# Patient Record
Sex: FEMALE | Race: BLACK OR AFRICAN AMERICAN | NOT HISPANIC OR LATINO | URBAN - METROPOLITAN AREA
[De-identification: names, ages, dates, MRNs, and addresses within clinical notes are randomized per-mention and may not be internally consistent; named-entity substitution may affect disease eponyms.]

---

## 2020-06-16 ENCOUNTER — OFFICE VISIT (OUTPATIENT)
Dept: URBAN - METROPOLITAN AREA SURGERY CENTER 16 | Facility: SURGERY CENTER | Age: 55
End: 2020-06-16

## 2020-06-17 ENCOUNTER — OFFICE VISIT (OUTPATIENT)
Dept: URBAN - METROPOLITAN AREA SURGERY CENTER 16 | Facility: SURGERY CENTER | Age: 55
End: 2020-06-17
Payer: COMMERCIAL

## 2020-06-17 ENCOUNTER — CLAIMS CREATED FROM THE CLAIM WINDOW (OUTPATIENT)
Dept: URBAN - METROPOLITAN AREA CLINIC 4 | Facility: CLINIC | Age: 55
End: 2020-06-17
Payer: COMMERCIAL

## 2020-06-17 DIAGNOSIS — R10.84 ABDOMINAL CRAMPING, GENERALIZED: ICD-10-CM

## 2020-06-17 DIAGNOSIS — R11.2 NAUSEA WITH VOMITING, UNSPECIFIED: ICD-10-CM

## 2020-06-17 DIAGNOSIS — K92.1 BLACK STOOL: ICD-10-CM

## 2020-06-17 DIAGNOSIS — D50.9 IRON DEFICIENCY ANEMIA, UNSPECIFIED: ICD-10-CM

## 2020-06-17 DIAGNOSIS — R19.7 ACUTE DIARRHEA: ICD-10-CM

## 2020-06-17 DIAGNOSIS — R10.9 UNSPECIFIED ABDOMINAL PAIN: ICD-10-CM

## 2020-06-17 PROCEDURE — 45380 COLONOSCOPY AND BIOPSY: CPT | Performed by: INTERNAL MEDICINE

## 2020-06-17 PROCEDURE — G9937 DIG OR SURV COLSCO: HCPCS | Performed by: INTERNAL MEDICINE

## 2020-06-17 PROCEDURE — G8907 PT DOC NO EVENTS ON DISCHARG: HCPCS | Performed by: INTERNAL MEDICINE

## 2020-06-17 PROCEDURE — 88305 TISSUE EXAM BY PATHOLOGIST: CPT | Performed by: PATHOLOGY

## 2020-07-30 ENCOUNTER — ERX REFILL RESPONSE (OUTPATIENT)
Age: 55
End: 2020-07-30

## 2020-07-30 RX ORDER — HYOSCYAMINE SULFATE 0.12 MG/1
TAKE 1 TABLET BY MOUTH OR SUBLINGUALLY EVERY 4 HOURS AS NEEDED FOR PAIN TABLET ORAL; SUBLINGUAL
Qty: 120 | Refills: 3

## 2020-08-05 ENCOUNTER — OFFICE VISIT (OUTPATIENT)
Dept: URBAN - METROPOLITAN AREA TELEHEALTH 2 | Facility: TELEHEALTH | Age: 55
End: 2020-08-05
Payer: COMMERCIAL

## 2020-08-05 DIAGNOSIS — K62.5 BRBPR (BRIGHT RED BLOOD PER RECTUM): ICD-10-CM

## 2020-08-05 DIAGNOSIS — K59.01 CONSTIPATION: ICD-10-CM

## 2020-08-05 DIAGNOSIS — R10.9 ABDOMINAL PAIN: ICD-10-CM

## 2020-08-05 DIAGNOSIS — R10.84 ABDOMINAL CRAMPING, GENERALIZED: ICD-10-CM

## 2020-08-05 DIAGNOSIS — K82.4 GALLBLADDER POLYP: ICD-10-CM

## 2020-08-05 DIAGNOSIS — R11.0 NAUSEA: ICD-10-CM

## 2020-08-05 PROCEDURE — 99214 OFFICE O/P EST MOD 30 MIN: CPT | Performed by: INTERNAL MEDICINE

## 2020-08-05 RX ORDER — HYOSCYAMINE SULFATE 0.12 MG/1
1 TAB PO/SL Q4HR PRN PAIN TABLET, ORALLY DISINTEGRATING ORAL
Qty: 120 | Refills: 3 | Status: ACTIVE | COMMUNITY
Start: 2020-04-29 | End: 2020-08-27

## 2020-08-05 RX ORDER — AMOXICILLIN 500 MG/1
TAKE 1 TABLET (500 MG) BY ORAL ROUTE 3 TIMES PER DAY TABLET, FILM COATED ORAL
Qty: 0 | Refills: 0 | Status: DISCONTINUED | COMMUNITY
Start: 1900-01-01

## 2020-08-05 RX ORDER — NAPROXEN 500 MG/1
TAKE 1 TABLET (500 MG) BY ORAL ROUTE 2 TIMES PER DAY WITH FOOD TABLET ORAL 2
Qty: 0 | Refills: 0 | Status: ACTIVE | COMMUNITY
Start: 1900-01-01

## 2020-08-05 RX ORDER — MILNACIPRAN HCL 12.5 MG
TAKE 2 TABLETS (25 MG) BY ORAL ROUTE 2 TIMES PER DAY TABLET ORAL 2
Qty: 0 | Refills: 0 | Status: ACTIVE | COMMUNITY
Start: 1900-01-01

## 2020-08-05 RX ORDER — VALSARTAN 40 MG/1
TABLET ORAL
Qty: 0 | Refills: 0 | Status: ACTIVE | COMMUNITY
Start: 1900-01-01

## 2020-08-05 RX ORDER — VERAPAMIL HYDROCHLORIDE 240 MG/1
TAKE 1 CAPSULE (240 MG) BY ORAL ROUTE ONCE DAILY CAPSULE, DELAYED RELEASE PELLETS ORAL 1
Qty: 0 | Refills: 0 | Status: ACTIVE | COMMUNITY
Start: 1900-01-01

## 2020-08-05 RX ORDER — ACETAMINOPHEN AND CODEINE PHOSPHATE 300; 30 MG/1; MG/1
TABLET ORAL
Qty: 0 | Refills: 0 | Status: ACTIVE | COMMUNITY
Start: 1900-01-01

## 2020-08-05 RX ORDER — OMEPRAZOLE 10 MG/1
CAPSULE, DELAYED RELEASE ORAL
Qty: 0 | Refills: 0 | Status: ACTIVE | COMMUNITY
Start: 1900-01-01

## 2020-08-05 RX ORDER — HYOSCYAMINE SULFATE 0.12 MG/1
TAKE 1 TABLET BY MOUTH OR SUBLINGUALLY EVERY 4 HOURS AS NEEDED FOR PAIN TABLET ORAL; SUBLINGUAL
Qty: 120 | Refills: 3 | Status: ACTIVE | COMMUNITY

## 2020-08-05 RX ORDER — TRAMADOL HYDROCHLORIDE 50 MG/1
TABLET, COATED ORAL
Qty: 0 | Refills: 0 | Status: ACTIVE | COMMUNITY
Start: 1900-01-01

## 2020-08-05 RX ORDER — SACCHAROMYCES BOULARDII 250 MG
TAKE 2 CAPSULES BY ORAL ROUTE 2 TIMES A DAY FOR 30 DAYS CAPSULE ORAL 2
Qty: 120 | Refills: 11 | Status: ACTIVE | COMMUNITY
Start: 2020-04-29 | End: 2021-04-24

## 2020-08-05 RX ORDER — HYDROXYCHLOROQUINE SULFATE 200 MG/1
TABLET ORAL
Qty: 0 | Refills: 0 | Status: ACTIVE | COMMUNITY
Start: 1900-01-01

## 2020-08-05 RX ORDER — ESOMEPRAZOLE MAGNESIUM 40 MG/1
TAKE 1 CAPSULE (40 MG) BY ORAL ROUTE ONCE DAILY CAPSULE, DELAYED RELEASE PELLETS ORAL 1
Qty: 0 | Refills: 0 | Status: ACTIVE | COMMUNITY
Start: 1900-01-01

## 2020-08-05 RX ORDER — CALCIUM POLYCARBOPHIL 625 MG
TAKE 2 TABLETS BY ORAL ROUTE 2 TIMES A DAY FOR 30 DAYS TABLET ORAL 2
Qty: 120 | Refills: 11 | Status: ACTIVE | COMMUNITY
Start: 2020-04-29 | End: 2021-04-24

## 2020-08-05 RX ORDER — AMOXICILLIN AND CLAVULANATE POTASSIUM 875; 125 MG/1; MG/1
TABLET, FILM COATED ORAL
Qty: 20 UNSPECIFIED | Status: ACTIVE | COMMUNITY

## 2020-08-05 RX ORDER — CETIRIZINE HYDROCHLORIDE 10 MG/1
CAPSULE, LIQUID FILLED ORAL
Qty: 0 | Refills: 0 | Status: ACTIVE | COMMUNITY
Start: 1900-01-01

## 2020-08-05 NOTE — HPI-OTHER HISTORIES
Telehealth visit  rx'ed Fibercon and Florastor BID and Levsin prn at prior visit   Abd pain less freq and severe - nothing sharp  No Hematochezia except v.rare BRBPR w wiping  Nausea has resolved  BM's now less regular - 1 BM/day despite Metamucil  wt incr 5# over 3mo  RUQ US 5/14/20 WNL aside from 3mm GB polyp Pelvic US small uterine fibroid  small L ovarian cyst small amt fluid cul de sac  Colonoscopy 6/16/20 WNL, bx's negative

## 2020-10-28 ENCOUNTER — ERX REFILL RESPONSE (OUTPATIENT)
Age: 55
End: 2020-10-28

## 2020-10-28 RX ORDER — HYOSCYAMINE SULFATE 0.12 MG/1
TAKE 1 TABLET BY MOUTH OR SUBLINGUALLY EVERY 4 HOURS AS NEEDED FOR PAIN TABLET ORAL; SUBLINGUAL
Qty: 120 | Refills: 3

## 2021-02-03 ENCOUNTER — ERX REFILL RESPONSE (OUTPATIENT)
Age: 56
End: 2021-02-03

## 2021-02-03 RX ORDER — HYOSCYAMINE SULFATE 0.12 MG/1
TAKE 1 TABLET BY MOUTH OR SUBLINGUALLY EVERY 4 HOURS AS NEEDED FOR PAIN TABLET ORAL; SUBLINGUAL
Qty: 120 | Refills: 3

## 2021-05-18 ENCOUNTER — ERX REFILL RESPONSE (OUTPATIENT)
Dept: URBAN - METROPOLITAN AREA CLINIC 92 | Facility: CLINIC | Age: 56
End: 2021-05-18

## 2021-05-18 RX ORDER — HYOSCYAMINE SULFATE 0.12 MG/1
1 TABLET AS NEEDED TABLET ORAL; SUBLINGUAL
Qty: 120 TABLET | Refills: 2

## 2021-08-01 ENCOUNTER — ERX REFILL RESPONSE (OUTPATIENT)
Dept: URBAN - METROPOLITAN AREA CLINIC 92 | Facility: CLINIC | Age: 56
End: 2021-08-01

## 2021-08-01 RX ORDER — HYOSCYAMINE SULFATE 0.12 MG/1
1 TABLET AS NEEDED TABLET ORAL; SUBLINGUAL
Qty: 120 TABLET | Refills: 2 | OUTPATIENT

## 2021-08-01 RX ORDER — HYOSCYAMINE SULFATE 0.12 MG/1
TAKE 1 TABLET AS NEEDED FOUR TIMES A DAY ORALLY 30 DAYS TABLET ORAL; SUBLINGUAL
Qty: 120 TABLET | Refills: 1 | OUTPATIENT

## 2021-08-03 ENCOUNTER — OFFICE VISIT (OUTPATIENT)
Dept: URBAN - METROPOLITAN AREA TELEHEALTH 2 | Facility: TELEHEALTH | Age: 56
End: 2021-08-03

## 2021-08-03 RX ORDER — AMOXICILLIN AND CLAVULANATE POTASSIUM 875; 125 MG/1; MG/1
TABLET, FILM COATED ORAL
Qty: 20 UNSPECIFIED | COMMUNITY

## 2021-08-03 RX ORDER — NAPROXEN 500 MG/1
TAKE 1 TABLET (500 MG) BY ORAL ROUTE 2 TIMES PER DAY WITH FOOD TABLET ORAL 2
Qty: 0 | Refills: 0 | COMMUNITY
Start: 1900-01-01

## 2021-08-03 RX ORDER — OMEPRAZOLE 10 MG/1
CAPSULE, DELAYED RELEASE ORAL
Qty: 0 | Refills: 0 | COMMUNITY
Start: 1900-01-01

## 2021-08-03 RX ORDER — VERAPAMIL HYDROCHLORIDE 240 MG/1
TAKE 1 CAPSULE (240 MG) BY ORAL ROUTE ONCE DAILY CAPSULE, DELAYED RELEASE PELLETS ORAL 1
Qty: 0 | Refills: 0 | COMMUNITY
Start: 1900-01-01

## 2021-08-03 RX ORDER — ACETAMINOPHEN AND CODEINE PHOSPHATE 300; 30 MG/1; MG/1
TABLET ORAL
Qty: 0 | Refills: 0 | COMMUNITY
Start: 1900-01-01

## 2021-08-03 RX ORDER — ESOMEPRAZOLE MAGNESIUM 40 MG/1
TAKE 1 CAPSULE (40 MG) BY ORAL ROUTE ONCE DAILY CAPSULE, DELAYED RELEASE PELLETS ORAL 1
Qty: 0 | Refills: 0 | COMMUNITY
Start: 1900-01-01

## 2021-08-03 RX ORDER — CETIRIZINE HYDROCHLORIDE 10 MG/1
CAPSULE, LIQUID FILLED ORAL
Qty: 0 | Refills: 0 | COMMUNITY
Start: 1900-01-01

## 2021-08-03 RX ORDER — HYOSCYAMINE SULFATE 0.12 MG/1
1 TABLET AS NEEDED TABLET ORAL; SUBLINGUAL
Qty: 120 TABLET | Refills: 2 | COMMUNITY

## 2021-08-03 RX ORDER — VALSARTAN 40 MG/1
TABLET ORAL
Qty: 0 | Refills: 0 | COMMUNITY
Start: 1900-01-01

## 2021-08-03 RX ORDER — MILNACIPRAN HCL 12.5 MG
TAKE 2 TABLETS (25 MG) BY ORAL ROUTE 2 TIMES PER DAY TABLET ORAL 2
Qty: 0 | Refills: 0 | COMMUNITY
Start: 1900-01-01

## 2021-08-03 RX ORDER — HYDROXYCHLOROQUINE SULFATE 200 MG/1
TABLET ORAL
Qty: 0 | Refills: 0 | COMMUNITY
Start: 1900-01-01

## 2021-08-03 RX ORDER — TRAMADOL HYDROCHLORIDE 50 MG/1
TABLET, COATED ORAL
Qty: 0 | Refills: 0 | COMMUNITY
Start: 1900-01-01

## 2021-08-03 NOTE — HPI-OTHER HISTORIES
Telehealth visit  wt __# over 1y (was 215)  on Fibercon and Florastor BID and Levsin prn at prior visit   Abd pain less freq and severe - nothing sharp  No Hematochezia except v.rare BRBPR w wiping  Nausea has resolved  BM's now less regular - 1 BM/day despite Metamucil  RUQ US 5/14/20 WNL aside from 3mm GB polyp Pelvic US small uterine fibroid  small L ovarian cyst small amt fluid cul de sac  Colonoscopy 6/16/20 WNL, bx's negative

## 2021-08-23 ENCOUNTER — ERX REFILL RESPONSE (OUTPATIENT)
Dept: URBAN - METROPOLITAN AREA CLINIC 92 | Facility: CLINIC | Age: 56
End: 2021-08-23

## 2021-08-23 RX ORDER — HYOSCYAMINE SULFATE 0.12 MG/1
TAKE 1 TABLET AS NEEDED FOUR TIMES A DAY ORALLY 30 DAYS TABLET ORAL; SUBLINGUAL
Qty: 120 TABLET | Refills: 1 | OUTPATIENT

## 2021-08-23 RX ORDER — HYOSCYAMINE SULFATE 0.12 MG/1
TAKE 1 TABLET BY MOUTH 4 TIMES A DAY AS NEEDED TABLET ORAL; SUBLINGUAL
Qty: 120 TABLET | Refills: 1 | OUTPATIENT

## 2022-04-06 ENCOUNTER — ERX REFILL RESPONSE (OUTPATIENT)
Dept: URBAN - METROPOLITAN AREA CLINIC 92 | Facility: CLINIC | Age: 57
End: 2022-04-06

## 2022-04-06 RX ORDER — HYOSCYAMINE SULFATE 0.12 MG/1
TAKE 1 TABLET BY MOUTH OR SUBLINGUALLY EVERY 4 HOURS AS NEEDED FOR PAIN 20 TABLET ORAL; SUBLINGUAL
Qty: 120 TABLET | Refills: 1 | OUTPATIENT

## 2022-04-06 RX ORDER — HYOSCYAMINE SULFATE 0.12 MG/1
TAKE 1 TABLET BY MOUTH 4 TIMES A DAY AS NEEDED TABLET ORAL; SUBLINGUAL
Qty: 120 TABLET | Refills: 1 | OUTPATIENT

## 2022-04-16 ENCOUNTER — ERX REFILL RESPONSE (OUTPATIENT)
Dept: URBAN - METROPOLITAN AREA CLINIC 92 | Facility: CLINIC | Age: 57
End: 2022-04-16

## 2022-04-16 RX ORDER — HYOSCYAMINE SULFATE 0.12 MG/1
TAKE 1 TABLET BY MOUTH OR SUBLINGUALLY EVERY 4 HOURS AS NEEDED FOR PAIN 20 TABLET ORAL; SUBLINGUAL
Qty: 120 TABLET | Refills: 1 | OUTPATIENT

## 2022-04-22 ENCOUNTER — OFFICE VISIT (OUTPATIENT)
Dept: URBAN - METROPOLITAN AREA TELEHEALTH 2 | Facility: TELEHEALTH | Age: 57
End: 2022-04-22

## 2022-04-22 RX ORDER — CETIRIZINE HYDROCHLORIDE 10 MG/1
CAPSULE, LIQUID FILLED ORAL
Qty: 0 | Refills: 0 | COMMUNITY
Start: 1900-01-01

## 2022-04-22 RX ORDER — TRAMADOL HYDROCHLORIDE 50 MG/1
TABLET, COATED ORAL
Qty: 0 | Refills: 0 | COMMUNITY
Start: 1900-01-01

## 2022-04-22 RX ORDER — NAPROXEN 500 MG/1
TAKE 1 TABLET (500 MG) BY ORAL ROUTE 2 TIMES PER DAY WITH FOOD TABLET ORAL 2
Qty: 0 | Refills: 0 | COMMUNITY
Start: 1900-01-01

## 2022-04-22 RX ORDER — OMEPRAZOLE 10 MG/1
CAPSULE, DELAYED RELEASE ORAL
Qty: 0 | Refills: 0 | COMMUNITY
Start: 1900-01-01

## 2022-04-22 RX ORDER — HYDROXYCHLOROQUINE SULFATE 200 MG/1
TABLET ORAL
Qty: 0 | Refills: 0 | COMMUNITY
Start: 1900-01-01

## 2022-04-22 RX ORDER — VALSARTAN 40 MG/1
TABLET ORAL
Qty: 0 | Refills: 0 | COMMUNITY
Start: 1900-01-01

## 2022-04-22 RX ORDER — VERAPAMIL HYDROCHLORIDE 240 MG/1
TAKE 1 CAPSULE (240 MG) BY ORAL ROUTE ONCE DAILY CAPSULE, DELAYED RELEASE PELLETS ORAL 1
Qty: 0 | Refills: 0 | COMMUNITY
Start: 1900-01-01

## 2022-04-22 RX ORDER — ACETAMINOPHEN AND CODEINE PHOSPHATE 300; 30 MG/1; MG/1
TABLET ORAL
Qty: 0 | Refills: 0 | COMMUNITY
Start: 1900-01-01

## 2022-04-22 RX ORDER — AMOXICILLIN AND CLAVULANATE POTASSIUM 875; 125 MG/1; MG/1
TABLET, FILM COATED ORAL
Qty: 20 UNSPECIFIED | COMMUNITY

## 2022-04-22 RX ORDER — ESOMEPRAZOLE MAGNESIUM 40 MG/1
TAKE 1 CAPSULE (40 MG) BY ORAL ROUTE ONCE DAILY CAPSULE, DELAYED RELEASE PELLETS ORAL 1
Qty: 0 | Refills: 0 | COMMUNITY
Start: 1900-01-01

## 2022-04-22 RX ORDER — MILNACIPRAN HCL 12.5 MG
TAKE 2 TABLETS (25 MG) BY ORAL ROUTE 2 TIMES PER DAY TABLET ORAL 2
Qty: 0 | Refills: 0 | COMMUNITY
Start: 1900-01-01

## 2022-04-22 RX ORDER — HYOSCYAMINE SULFATE 0.12 MG/1
TAKE 1 TABLET BY MOUTH OR SUBLINGUALLY EVERY 4 HOURS AS NEEDED FOR PAIN 20 TABLET ORAL; SUBLINGUAL
Qty: 120 TABLET | Refills: 1 | COMMUNITY

## 2022-04-22 NOTE — HPI-OTHER HISTORIES
wt __# over 1.5yr (was 215)  on Metamucil and Miralax  Abd pain less freq and severe - nothing sharp  No Hematochezia except v.rare BRBPR w wiping  Nausea has resolved  BM's now less regular - 1 BM/day despite Metamucil  RUQ US 5/14/20 WNL aside from 3mm GB polyp Pelvic US small uterine fibroid  small L ovarian cyst small amt fluid cul de sac  Colonoscopy 6/16/20 WNL, bx's negative

## 2022-07-13 ENCOUNTER — DASHBOARD ENCOUNTERS (OUTPATIENT)
Age: 57
End: 2022-07-13

## 2022-07-13 PROBLEM — 14760008 CONSTIPATION: Status: ACTIVE | Noted: 2021-07-29

## 2022-07-15 ENCOUNTER — OFFICE VISIT (OUTPATIENT)
Dept: URBAN - METROPOLITAN AREA TELEHEALTH 2 | Facility: TELEHEALTH | Age: 57
End: 2022-07-15

## 2022-07-15 RX ORDER — VERAPAMIL HYDROCHLORIDE 240 MG/1
TAKE 1 CAPSULE (240 MG) BY ORAL ROUTE ONCE DAILY CAPSULE, DELAYED RELEASE PELLETS ORAL 1
Qty: 0 | Refills: 0 | COMMUNITY
Start: 1900-01-01

## 2022-07-15 RX ORDER — NAPROXEN 500 MG/1
TAKE 1 TABLET (500 MG) BY ORAL ROUTE 2 TIMES PER DAY WITH FOOD TABLET ORAL 2
Qty: 0 | Refills: 0 | COMMUNITY
Start: 1900-01-01

## 2022-07-15 RX ORDER — CETIRIZINE HYDROCHLORIDE 10 MG/1
CAPSULE, LIQUID FILLED ORAL
Qty: 0 | Refills: 0 | COMMUNITY
Start: 1900-01-01

## 2022-07-15 RX ORDER — ACETAMINOPHEN AND CODEINE PHOSPHATE 300; 30 MG/1; MG/1
TABLET ORAL
Qty: 0 | Refills: 0 | COMMUNITY
Start: 1900-01-01

## 2022-07-15 RX ORDER — HYOSCYAMINE SULFATE 0.12 MG/1
TAKE 1 TABLET BY MOUTH OR SUBLINGUALLY EVERY 4 HOURS AS NEEDED FOR PAIN 20 TABLET ORAL; SUBLINGUAL
Qty: 120 TABLET | Refills: 1 | COMMUNITY

## 2022-07-15 RX ORDER — OMEPRAZOLE 10 MG/1
CAPSULE, DELAYED RELEASE ORAL
Qty: 0 | Refills: 0 | COMMUNITY
Start: 1900-01-01

## 2022-07-15 RX ORDER — VALSARTAN 40 MG/1
TABLET ORAL
Qty: 0 | Refills: 0 | COMMUNITY
Start: 1900-01-01

## 2022-07-15 RX ORDER — AMOXICILLIN AND CLAVULANATE POTASSIUM 875; 125 MG/1; MG/1
TABLET, FILM COATED ORAL
Qty: 20 UNSPECIFIED | COMMUNITY

## 2022-07-15 RX ORDER — HYDROXYCHLOROQUINE SULFATE 200 MG/1
TABLET ORAL
Qty: 0 | Refills: 0 | COMMUNITY
Start: 1900-01-01

## 2022-07-15 RX ORDER — MILNACIPRAN HCL 12.5 MG
TAKE 2 TABLETS (25 MG) BY ORAL ROUTE 2 TIMES PER DAY TABLET ORAL 2
Qty: 0 | Refills: 0 | COMMUNITY
Start: 1900-01-01

## 2022-07-15 RX ORDER — TRAMADOL HYDROCHLORIDE 50 MG/1
TABLET, COATED ORAL
Qty: 0 | Refills: 0 | COMMUNITY
Start: 1900-01-01

## 2022-07-15 RX ORDER — ESOMEPRAZOLE MAGNESIUM 40 MG/1
TAKE 1 CAPSULE (40 MG) BY ORAL ROUTE ONCE DAILY CAPSULE, DELAYED RELEASE PELLETS ORAL 1
Qty: 0 | Refills: 0 | COMMUNITY
Start: 1900-01-01

## 2022-07-15 NOTE — HPI-OTHER HISTORIES
wt __# over 2yr (was 215)  on Metamucil and Miralax  Abd pain less freq and severe - nothing sharp  No Hematochezia except v.rare BRBPR w wiping  Nausea has resolved  BM's now less regular - 1 BM/day despite Metamucil  RUQ US 5/14/20 WNL aside from 3mm GB polyp Pelvic US small uterine fibroid  small L ovarian cyst small amt fluid cul de sac  Colonoscopy 6/16/20 WNL, bx's negative

## 2022-07-24 ENCOUNTER — TELEPHONE ENCOUNTER (OUTPATIENT)
Dept: URBAN - METROPOLITAN AREA CLINIC 92 | Facility: CLINIC | Age: 57
End: 2022-07-24

## 2022-07-24 ENCOUNTER — ERX REFILL RESPONSE (OUTPATIENT)
Dept: URBAN - METROPOLITAN AREA CLINIC 92 | Facility: CLINIC | Age: 57
End: 2022-07-24

## 2022-07-24 RX ORDER — HYOSCYAMINE SULFATE 0.12 MG/1
TAKE 1 TABLET BY MOUTH OR SUBLINGUALLY EVERY 4 HOURS AS NEEDED FOR PAIN 20 TABLET ORAL; SUBLINGUAL
Qty: 120 TABLET | Refills: 1 | OUTPATIENT

## 2022-07-24 RX ORDER — HYOSCYAMINE SULFATE 0.12 MG/1
TAKE 1 TABLET BY MOUTH OR SUBLINGUALLY EVERY 4 HOURS AS NEEDED FOR PAIN 20 TABLET ORAL; SUBLINGUAL
Qty: 120 TABLET | Refills: 0 | OUTPATIENT

## 2022-08-04 ENCOUNTER — OFFICE VISIT (OUTPATIENT)
Dept: URBAN - METROPOLITAN AREA TELEHEALTH 2 | Facility: TELEHEALTH | Age: 57
End: 2022-08-04

## 2022-08-10 ENCOUNTER — TELEPHONE ENCOUNTER (OUTPATIENT)
Dept: URBAN - METROPOLITAN AREA CLINIC 92 | Facility: CLINIC | Age: 57
End: 2022-08-10

## 2022-12-22 NOTE — PHYSICAL EXAM HENT:
Head, normocephalic, atraumatic, Face, Face within normal limits, Ears, External ears within normal limits, Nose/Nasopharynx, External nose  normal appearance, nares patent, no nasal discharge, Mouth and Throat, Oral cavity appearance normal, Breath odor normal, Lips, Appearance normal [Negative] : Heme/Lymph